# Patient Record
Sex: FEMALE | Race: WHITE | NOT HISPANIC OR LATINO
[De-identification: names, ages, dates, MRNs, and addresses within clinical notes are randomized per-mention and may not be internally consistent; named-entity substitution may affect disease eponyms.]

---

## 2021-10-22 PROBLEM — Z00.00 ENCOUNTER FOR PREVENTIVE HEALTH EXAMINATION: Status: ACTIVE | Noted: 2021-10-22

## 2021-12-29 ENCOUNTER — APPOINTMENT (OUTPATIENT)
Dept: CARDIOLOGY | Facility: CLINIC | Age: 51
End: 2021-12-29

## 2022-08-25 ENCOUNTER — APPOINTMENT (OUTPATIENT)
Dept: CARDIOLOGY | Facility: CLINIC | Age: 52
End: 2022-08-25

## 2022-08-25 VITALS
SYSTOLIC BLOOD PRESSURE: 132 MMHG | DIASTOLIC BLOOD PRESSURE: 80 MMHG | HEIGHT: 71 IN | WEIGHT: 280 LBS | TEMPERATURE: 97.3 F | HEART RATE: 72 BPM | BODY MASS INDEX: 39.2 KG/M2 | OXYGEN SATURATION: 97 %

## 2022-08-25 DIAGNOSIS — Z82.49 FAMILY HISTORY OF ISCHEMIC HEART DISEASE AND OTHER DISEASES OF THE CIRCULATORY SYSTEM: ICD-10-CM

## 2022-08-25 DIAGNOSIS — R03.0 ELEVATED BLOOD-PRESSURE READING, W/OUT DIAGNOSIS OF HYPERTENSION: ICD-10-CM

## 2022-08-25 DIAGNOSIS — R12 HEARTBURN: ICD-10-CM

## 2022-08-25 PROCEDURE — 99407 BEHAV CHNG SMOKING > 10 MIN: CPT

## 2022-08-25 PROCEDURE — 99205 OFFICE O/P NEW HI 60 MIN: CPT

## 2022-08-25 NOTE — REASON FOR VISIT
[FreeTextEntry1] : Moira 51-year-old female with no significant past cardiovascular history but a strong family history of premature CAD.  She is also a mild chronic smoker and has obesity as well as mild hypercholesterolemia.  \par \par She is fairly active without any exertional chest pain or shortness of breath.  He has occasional, random and intermittent lateral upper chest discomfort with vague character.  She denies PND, orthopnea, pedal edema.  Her weight is steady around 280 pounds with BMI of 39.\par \par No history of hypertension or diabetes.\par \par

## 2022-08-25 NOTE — PHYSICAL EXAM
[Well Developed] : well developed [Well Nourished] : well nourished [No Acute Distress] : no acute distress [Normal Conjunctiva] : normal conjunctiva [Normal Venous Pressure] : normal venous pressure [No Carotid Bruit] : no carotid bruit [Normal S1, S2] : normal S1, S2 [No Murmur] : no murmur [No Rub] : no rub [No Gallop] : no gallop [Clear Lung Fields] : clear lung fields [Good Air Entry] : good air entry [No Respiratory Distress] : no respiratory distress  [Soft] : abdomen soft [Non Tender] : non-tender [Normal Gait] : normal gait [No Edema] : no edema [No Cyanosis] : no cyanosis [No Clubbing] : no clubbing [No Varicosities] : no varicosities [No Rash] : no rash [No Skin Lesions] : no skin lesions [Moves all extremities] : moves all extremities [No Focal Deficits] : no focal deficits [Normal Speech] : normal speech [Alert and Oriented] : alert and oriented [Normal memory] : normal memory [de-identified] : Obese

## 2022-08-25 NOTE — DISCUSSION/SUMMARY
[FreeTextEntry1] : 51-year-old female with atypical chest pain.  Several risk factors including obesity, chronic smoking, hypercholesterolemia, strong family history of premature CAD (mother had MI at age 50, father had CABG at age 60)\par \par Further evaluation with echo and ETT should be performed.  CT of coronaries may been required to correctly evaluate her CAD: With obesity, acoustic windows may be suboptimal.\par \par Check fasting lipids, A1c\par \par If she has typical or atypical angina (various forms of presentation of CAD in women was discussed), call 911 and go to the ER stat.  Patient understands this.\par \par Patient is not taking any statins or aspirin.  The long-term, weight reduction strategies will be needed and if unsuccessful, even bariatric procedure can be considered.\par \par Chronic smoking: She smokes a few cigarettes a day.  She understands the risk of continued smoking and benefits of cessation\par \par Thank you for this referral and allowing me to participate in the care of this patient.  If I can be of any further help or  if you have any questions, please do not hesitate to contact me\par \par \par Sincerely,\par \par Jose Hanson MD, FAC, JOAN

## 2022-09-16 ENCOUNTER — APPOINTMENT (OUTPATIENT)
Dept: CARDIOLOGY | Facility: CLINIC | Age: 52
End: 2022-09-16

## 2022-09-16 ENCOUNTER — NON-APPOINTMENT (OUTPATIENT)
Age: 52
End: 2022-09-16

## 2022-09-16 PROCEDURE — 93015 CV STRESS TEST SUPVJ I&R: CPT

## 2022-09-16 PROCEDURE — 93306 TTE W/DOPPLER COMPLETE: CPT

## 2022-09-27 ENCOUNTER — NON-APPOINTMENT (OUTPATIENT)
Age: 52
End: 2022-09-27

## 2022-10-06 ENCOUNTER — NON-APPOINTMENT (OUTPATIENT)
Age: 52
End: 2022-10-06

## 2022-10-06 ENCOUNTER — APPOINTMENT (OUTPATIENT)
Dept: CARDIOLOGY | Facility: CLINIC | Age: 52
End: 2022-10-06

## 2022-10-06 VITALS
SYSTOLIC BLOOD PRESSURE: 124 MMHG | BODY MASS INDEX: 38.64 KG/M2 | HEART RATE: 79 BPM | WEIGHT: 276 LBS | OXYGEN SATURATION: 97 % | HEIGHT: 71 IN | DIASTOLIC BLOOD PRESSURE: 78 MMHG

## 2022-10-06 DIAGNOSIS — R73.01 IMPAIRED FASTING GLUCOSE: ICD-10-CM

## 2022-10-06 DIAGNOSIS — R07.89 OTHER CHEST PAIN: ICD-10-CM

## 2022-10-06 DIAGNOSIS — E78.5 HYPERLIPIDEMIA, UNSPECIFIED: ICD-10-CM

## 2022-10-06 PROCEDURE — 99214 OFFICE O/P EST MOD 30 MIN: CPT

## 2022-10-07 PROBLEM — R07.89 CHEST TIGHTNESS: Status: ACTIVE | Noted: 2022-08-25

## 2022-10-07 PROBLEM — E78.5 HYPERLIPIDEMIA, UNSPECIFIED: Status: ACTIVE | Noted: 2022-08-25

## 2022-10-07 NOTE — PHYSICAL EXAM
[Well Developed] : well developed [Well Nourished] : well nourished [No Acute Distress] : no acute distress [Normal Venous Pressure] : normal venous pressure [No Carotid Bruit] : no carotid bruit [Normal S1, S2] : normal S1, S2 [No Murmur] : no murmur [No Rub] : no rub [No Gallop] : no gallop [Clear Lung Fields] : clear lung fields [Good Air Entry] : good air entry [No Respiratory Distress] : no respiratory distress  [Normal Gait] : normal gait [No Edema] : no edema [No Rash] : no rash [Moves all extremities] : moves all extremities [No Focal Deficits] : no focal deficits [Normal Speech] : normal speech [Alert and Oriented] : alert and oriented [Normal memory] : normal memory [de-identified] : Obese

## 2022-10-07 NOTE — ASSESSMENT
[FreeTextEntry1] : Reviewed today:\par CTA coronaries: 9/27/2022: Whole heart calcium score equals 0.  Incidental pulmonary nodule noted.\par \par Echo 9/16/2022: EF 60-65% Mild MR Normal LV systolic function and no seg. wall motion abnormalities. Mild TR. Normal PASP. \par \par ETT 9/16/2022: Exercise 6:45 adequate BP response. 8 METS No evidence of ischemia by EKG. \par \par Labs 8/29/2022: Total chol 186  CRP 1.9 \par \par -Sinus rhythm.  Nonspecific T waves.

## 2022-10-07 NOTE — DISCUSSION/SUMMARY
[FreeTextEntry1] : ZIOLA LIN is a 51 year old F who presents today with the above history and the following active issues: \par \par  Several risk factors including obesity, chronic smoking, hypercholesterolemia, strong family history of premature CAD (mother had MI at age 50, father had CABG at age 60)\par -CTA Cors- Ca score 0 \par - Pul nodule- d/w pt in detail. She will follow up with her PCP or Pulm for re-evaluation. Printed report given to patient. \par \par Total chol 186  CRP 1.9 Ten-Year Risk:  ASCVD 3.06% \par Will wait for A1C to be resulted. \par \par Patient is not taking any statins or aspirin.  The long-term, weight reduction strategies will be needed and if unsuccessful, even bariatric procedure can be considered.\par \par Chronic smoking: She smokes a few cigarettes a day.  She understands the risk of continued smoking and benefits of cessation\par \par 1 year follow up or sooner if needed\par \par Discussed red flag symptoms, which would warrant sooner or emergent medical evaluation.\par Any questions and concerns were addressed and resolved. \par \par Sincerely,\par \par Gayle Lynch ANP-C\par Patients history, testing, and plan reviewed with supervising MD: Dr. Jose Hanson MD, [facility], [facility]

## 2022-10-07 NOTE — REASON FOR VISIT
[FreeTextEntry1] : Moira 51-year-old female with no significant past cardiovascular history but a strong family history of premature CAD.  She is also a mild chronic smoker and has obesity as well as mild hypercholesterolemia.  \par \par She is fairly active without any exertional chest pain or shortness of breath.  He has occasional, random and intermittent lateral upper chest discomfort with vague character.  She denies PND, orthopnea, pedal edema.  Her weight is steady around 280 pounds with BMI of 39.\par \par No history of hypertension or diabetes.\par \par She presents today to review her recent testing. \par \par

## 2023-11-14 ENCOUNTER — APPOINTMENT (OUTPATIENT)
Dept: CARDIOLOGY | Facility: CLINIC | Age: 53
End: 2023-11-14